# Patient Record
Sex: FEMALE | Race: WHITE | NOT HISPANIC OR LATINO | Employment: OTHER | URBAN - METROPOLITAN AREA
[De-identification: names, ages, dates, MRNs, and addresses within clinical notes are randomized per-mention and may not be internally consistent; named-entity substitution may affect disease eponyms.]

---

## 2017-02-27 NOTE — PATIENT DISCUSSION
POAG, OU: INTRAOCULAR PRESSURE IS WITHIN ACCEPTABLE LIMITS. PT INSTRUCTED TO CONTINUE LATANOPROST QHS OU AND COSOPT BID OU AND RETURN FOR FOLLOW-UP AS SCHEDULED.

## 2017-02-27 NOTE — PATIENT DISCUSSION
CATARACTS, OU - VISUALLY SIGNIFICANT- OD&gt;OS. REFER BACK TO DR. CIRO ALEXIS FOR PRE-OP TESTING, OD FIRST.

## 2017-02-27 NOTE — PATIENT DISCUSSION
Continue: dorzolamide-timolol (dorzolamide-timolol): drops: 2-0.5% 1 drop twice a day into both eyes 08-

## 2017-03-20 NOTE — PATIENT DISCUSSION
CATARACT OD: RBA'S DISCUSSED, PATIENT UNDERSTANDS AND DESIRES TO PROCEED WITH SURGERY. CONSENT READ AND SIGNED.   PATIENT DESIRES STANDARD SET FOR NEAR VISION *(-2.25)

## 2017-03-20 NOTE — PATIENT DISCUSSION
CATARACTS, OU: VISUALLY SIGNIFICANT. OPTION OF SURGERY VERSUS FOLLOWING VERSUS UPDATING GLASSES DISCUSSED. RBA'S DISCUSSED, PATIENT UNDERSTANDS AND DESIRES SURGERY TO INCREASE VISION FOR DRIVING AND GLARE AT NIGHT. SCHEDULE CATARACT SURGERY/PRE-OP OU TODAY.

## 2017-10-23 ENCOUNTER — NEW PATIENT (OUTPATIENT)
Dept: URBAN - METROPOLITAN AREA CLINIC 26 | Facility: CLINIC | Age: 72
End: 2017-10-23

## 2017-10-23 VITALS — WEIGHT: 135 LBS | HEIGHT: 66 IN | BODY MASS INDEX: 21.69 KG/M2

## 2017-10-23 DIAGNOSIS — H43.813: ICD-10-CM

## 2017-10-23 DIAGNOSIS — H35.373: ICD-10-CM

## 2017-10-23 DIAGNOSIS — E07.9: ICD-10-CM

## 2017-10-23 DIAGNOSIS — H47.011: ICD-10-CM

## 2017-10-23 DIAGNOSIS — H35.61: ICD-10-CM

## 2017-10-23 DIAGNOSIS — H43.392: ICD-10-CM

## 2017-10-23 DIAGNOSIS — H35.372: ICD-10-CM

## 2017-10-23 PROCEDURE — 1036F TOBACCO NON-USER: CPT

## 2017-10-23 PROCEDURE — 92235 FLUORESCEIN ANGRPH MLTIFRAME: CPT

## 2017-10-23 PROCEDURE — G8427 DOCREV CUR MEDS BY ELIG CLIN: HCPCS

## 2017-10-23 PROCEDURE — 92083 EXTENDED VISUAL FIELD XM: CPT

## 2017-10-23 PROCEDURE — G8420 CALC BMI NORM PARAMETERS: HCPCS

## 2017-10-23 PROCEDURE — 92225 OPHTHALMOSCOPY (INITIAL): CPT

## 2017-10-23 PROCEDURE — 92250 FUNDUS PHOTOGRAPHY W/I&R: CPT

## 2017-10-23 PROCEDURE — 99204 OFFICE O/P NEW MOD 45 MIN: CPT

## 2017-10-23 ASSESSMENT — TONOMETRY
OS_IOP_MMHG: 10
OD_IOP_MMHG: 16

## 2017-10-23 ASSESSMENT — VISUAL ACUITY
OD_CC: 20/20-1
OS_CC: 20/20

## 2018-04-12 NOTE — PATIENT DISCUSSION
CORNEAL SCAR, OS:  PRESCRIBED ARTIFICIAL TEARS AND UV PROTECTION. RETURN FOR FOLLOW-UP AS SCHEDULED.

## 2018-04-12 NOTE — PATIENT DISCUSSION
POAG, OU: INTRAOCULAR PRESSURE AND RNFL OCT IS WITHIN ACCEPTABLE LIMITS. PT INSTRUCTED TO CONTINUE LATANOPROST QHS OU AND COSOPT BID OU AND RETURN FOR FOLLOW-UP AS SCHEDULED.

## 2018-06-18 ENCOUNTER — NEW PATIENT COMPREHENSIVE (OUTPATIENT)
Dept: URBAN - METROPOLITAN AREA CLINIC 43 | Facility: CLINIC | Age: 73
End: 2018-06-18

## 2018-06-18 VITALS
HEIGHT: 55 IN | HEART RATE: 61 BPM | SYSTOLIC BLOOD PRESSURE: 149 MMHG | RESPIRATION RATE: 14 BRPM | DIASTOLIC BLOOD PRESSURE: 82 MMHG

## 2018-06-18 DIAGNOSIS — Z96.1: ICD-10-CM

## 2018-06-18 DIAGNOSIS — H04.123: ICD-10-CM

## 2018-06-18 DIAGNOSIS — H18.51: ICD-10-CM

## 2018-06-18 DIAGNOSIS — H43.813: ICD-10-CM

## 2018-06-18 DIAGNOSIS — H35.373: ICD-10-CM

## 2018-06-18 DIAGNOSIS — E05.00: ICD-10-CM

## 2018-06-18 PROCEDURE — 1036F TOBACCO NON-USER: CPT

## 2018-06-18 PROCEDURE — G8427 DOCREV CUR MEDS BY ELIG CLIN: HCPCS

## 2018-06-18 PROCEDURE — A4263 PERMANENT TEAR DUCT PLUG: HCPCS

## 2018-06-18 PROCEDURE — G8754 DIAS BP LESS 90: HCPCS

## 2018-06-18 PROCEDURE — 4040F PNEUMOC VAC/ADMIN/RCVD: CPT

## 2018-06-18 PROCEDURE — G8753 SYS BP > OR = 140: HCPCS

## 2018-06-18 PROCEDURE — 92286 ANT SGM IMG I&R SPECLR MIC: CPT

## 2018-06-18 PROCEDURE — 68761S PUNCTUM PLUG / SILICONE,EACH

## 2018-06-18 PROCEDURE — 99204 OFFICE O/P NEW MOD 45 MIN: CPT

## 2018-06-18 PROCEDURE — 92134 CPTRZ OPH DX IMG PST SGM RTA: CPT

## 2018-06-18 PROCEDURE — 92015 DETERMINE REFRACTIVE STATE: CPT

## 2018-06-18 ASSESSMENT — VISUAL ACUITY
OD_GLARE: 20/400
OS_SC: J2
OS_GLARE: 20/400
OS_SC: 20/50-1
OD_CC: J1
OS_CC: 20/25-2
OD_CC: 20/25+2
OD_SC: J2
OS_CC: J1
OD_SC: 20/80-1

## 2018-06-18 ASSESSMENT — TONOMETRY
OD_IOP_MMHG: 20
OS_IOP_MMHG: 20

## 2018-06-29 ENCOUNTER — TECH ONLY (OUTPATIENT)
Dept: URBAN - METROPOLITAN AREA CLINIC 46 | Facility: CLINIC | Age: 73
End: 2018-06-29

## 2018-06-29 DIAGNOSIS — E05.00: ICD-10-CM

## 2018-06-29 DIAGNOSIS — Z96.1: ICD-10-CM

## 2018-06-29 PROCEDURE — 99211T TECH SERVICE

## 2018-06-29 PROCEDURE — 92083 EXTENDED VISUAL FIELD XM: CPT

## 2018-10-02 ENCOUNTER — RETINA CONSULT (OUTPATIENT)
Dept: URBAN - METROPOLITAN AREA CLINIC 46 | Facility: CLINIC | Age: 73
End: 2018-10-02

## 2018-10-02 DIAGNOSIS — H35.371: ICD-10-CM

## 2018-10-02 DIAGNOSIS — H35.30: ICD-10-CM

## 2018-10-02 DIAGNOSIS — H43.813: ICD-10-CM

## 2018-10-02 DIAGNOSIS — H35.372: ICD-10-CM

## 2018-10-02 PROCEDURE — G8756 NO BP MEASURE DOC: HCPCS

## 2018-10-02 PROCEDURE — G9903 PT SCRN TBCO ID AS NON USER: HCPCS

## 2018-10-02 PROCEDURE — 2019F DILATED MACUL EXAM DONE: CPT

## 2018-10-02 PROCEDURE — G8427 DOCREV CUR MEDS BY ELIG CLIN: HCPCS

## 2018-10-02 PROCEDURE — 9222550 BILAT EXTENDED OPHTHALMOSCOPY, FIRST

## 2018-10-02 PROCEDURE — 1036F TOBACCO NON-USER: CPT

## 2018-10-02 PROCEDURE — 92250 FUNDUS PHOTOGRAPHY W/I&R: CPT

## 2018-10-02 PROCEDURE — 92134 CPTRZ OPH DX IMG PST SGM RTA: CPT

## 2018-10-02 PROCEDURE — 4177F TALK PT/CRGVR RE AREDS PREV: CPT

## 2018-10-02 PROCEDURE — 92014 COMPRE OPH EXAM EST PT 1/>: CPT

## 2018-10-02 PROCEDURE — 92235 FLUORESCEIN ANGRPH MLTIFRAME: CPT

## 2018-10-02 ASSESSMENT — TONOMETRY
OD_IOP_MMHG: 20
OS_IOP_MMHG: 14

## 2018-10-02 ASSESSMENT — VISUAL ACUITY
OD_CC: 20/20-1
OS_CC: 20/20-1

## 2018-10-12 NOTE — PATIENT DISCUSSION
POAG, OU: INTRAOCULAR PRESSURE IS WITHIN ACCEPTABLE LIMITS. PT INSTRUCTED TO CONTINUE COSOPT BID OU AND LATANOPROST QHS OU AND RETURN FOR FOLLOW-UP AS SCHEDULED.

## 2019-04-02 ENCOUNTER — ESTABLISHED COMPREHENSIVE EXAM (OUTPATIENT)
Dept: URBAN - METROPOLITAN AREA CLINIC 46 | Facility: CLINIC | Age: 74
End: 2019-04-02

## 2019-04-02 DIAGNOSIS — H35.372: ICD-10-CM

## 2019-04-02 DIAGNOSIS — E05.00: ICD-10-CM

## 2019-04-02 DIAGNOSIS — H35.30: ICD-10-CM

## 2019-04-02 DIAGNOSIS — H43.813: ICD-10-CM

## 2019-04-02 DIAGNOSIS — H35.371: ICD-10-CM

## 2019-04-02 PROCEDURE — 92134 CPTRZ OPH DX IMG PST SGM RTA: CPT

## 2019-04-02 PROCEDURE — 92014 COMPRE OPH EXAM EST PT 1/>: CPT

## 2019-04-02 PROCEDURE — 9222650 BILAT EXTENDED OPHTHALMOSCOPY, F/U

## 2019-04-02 ASSESSMENT — TONOMETRY
OD_IOP_MMHG: 20
OS_IOP_MMHG: 15

## 2019-04-02 ASSESSMENT — VISUAL ACUITY
OS_CC: 20/20-2
OD_CC: 20/20

## 2019-04-17 NOTE — PATIENT DISCUSSION
POSTERIOR CAPSULAR FIBROSIS, OD:  VISUALLY SIGNIFICANT. OPTION OF YAG LASER VERSUS UPDATING GLASSES VERSUS FOLLOWING DISCUSSED. RBA'S DISCUSSED, PATIENT UNDERSTANDS AND DESIRES YAG LASER TO INCREASE VISION FOR WATCHING TV AND DRIVING AT NIGHT. SCHEDULE YAG LASER OD AFTER CAT SX OS. DO PRE-OP YAG PAPERWORK AT 2 WEEK POST-OP CAT VISIT.

## 2019-04-17 NOTE — PATIENT DISCUSSION
CATARACTS, OS: VISUALLY SIGNIFICANT. OPTION OF SURGERY VERSUS FOLLOWING VERSUS UPDATING GLASSES DISCUSSED. RBA'S DISCUSSED, PATIENT UNDERSTANDS AND DESIRES SURGERY TO INCREASE VISION FOR DRIVING AT NIGHT AND WATCHING TV.  SCHEDULE CATARACT SURGERY/PRE-OP OS. (DO PRE-OP YAG PAPERWORK AT 2 WEEK POST-OP CAT VISIT).

## 2019-04-17 NOTE — PATIENT DISCUSSION
AMD (DRY), OD:  PRESCRIBE AREDS 2 VITAMINS AND UV PROTECTION TO SLOW PROGRESSION. SMOKING CESSATION EMPHASIZED. PRESCRIBE REGULAR AMSLER GRID CHECKS TO SELF MONITOR. PATIENT TO CALL WITH CHANGES FOR RETINA CONSULT. RETURN FOR FOLLOW-UP AS SCHEDULED.

## 2019-05-13 ENCOUNTER — TECH ONLY (OUTPATIENT)
Dept: URBAN - METROPOLITAN AREA CLINIC 46 | Facility: CLINIC | Age: 74
End: 2019-05-13

## 2019-05-13 DIAGNOSIS — H35.372: ICD-10-CM

## 2019-05-13 DIAGNOSIS — E05.00: ICD-10-CM

## 2019-05-13 DIAGNOSIS — H35.371: ICD-10-CM

## 2019-05-13 PROCEDURE — 92083 EXTENDED VISUAL FIELD XM: CPT

## 2019-05-13 PROCEDURE — 99211T TECH SERVICE

## 2019-08-09 NOTE — PATIENT DISCUSSION
POAG, OU: INTRAOCULAR PRESSURE IS WITHIN ACCEPTABLE LIMITS. PHOTOS TO DOCUMENT ONH. HVFS STABLE. PT INSTRUCTED TO CONTINUE COSOPT BID OU AND LATANOPROST QHS OU AND RETURN FOR FOLLOW-UP AS SCHEDULED. SWITCH TO 10-2 AT NEXT HVF OD.

## 2019-10-15 ENCOUNTER — FOLLOW UP (OUTPATIENT)
Dept: URBAN - METROPOLITAN AREA CLINIC 46 | Facility: CLINIC | Age: 74
End: 2019-10-15

## 2019-10-15 DIAGNOSIS — H43.813: ICD-10-CM

## 2019-10-15 DIAGNOSIS — E05.00: ICD-10-CM

## 2019-10-15 DIAGNOSIS — H35.30: ICD-10-CM

## 2019-10-15 DIAGNOSIS — H35.372: ICD-10-CM

## 2019-10-15 DIAGNOSIS — H35.371: ICD-10-CM

## 2019-10-15 PROCEDURE — 92250 FUNDUS PHOTOGRAPHY W/I&R: CPT

## 2019-10-15 PROCEDURE — 9222650 BILAT EXTENDED OPHTHALMOSCOPY, F/U

## 2019-10-15 PROCEDURE — 92014 COMPRE OPH EXAM EST PT 1/>: CPT

## 2019-10-15 PROCEDURE — 92134 CPTRZ OPH DX IMG PST SGM RTA: CPT

## 2019-10-15 PROCEDURE — 92235 FLUORESCEIN ANGRPH MLTIFRAME: CPT

## 2019-10-15 ASSESSMENT — VISUAL ACUITY
OD_CC: 20/20-1
OS_CC: 20/20

## 2019-10-15 ASSESSMENT — TONOMETRY
OS_IOP_MMHG: 16
OD_IOP_MMHG: 11

## 2019-10-25 NOTE — PATIENT DISCUSSION
VISUAL DISTURBANCE OU: PATIENT IS UNAWARE WHICH EYE HE WAS SEEING THE LIGHTS/DISTURBANCE. I ADVISED HIM THAT THE CATARACT IN HIS LEFT EYE CAN CAUSE GLARE, HALOS AND STARBURSTS AROUND LIGHTS AND THAT HIS CATARACT OS IS ADVANCED ENOUGH FOR THOSE TO BE PREVALENT. WHAT HE DESCRIBED WAS MORE ALONG THE LINES OF A VISUAL AURA, STATES THAT HE HAS NOT HAD A STROKE AND DENIES TINGLING, NUMBNESS OR HEADACHES. WILL SEND NOTES TO HIS PCP TO INVESTIGATE FURTHER, STATES THAT HE HAD AN APPT ON FRIDAY, BUT DID NOT MENTION VISUAL SYMPTOMS. WILL SCHEDULE PRE-OP FOR CAT SX OS. RECOMMENDED PT PAY CLOSE ATTENTION TO WHICH EYE SYMPTOMS OCCUR IN IF HE EXPERIENCES ANOTHER EPISODE.

## 2019-10-25 NOTE — PATIENT DISCUSSION
POAG, OU: INTRAOCULAR PRESSURE IS WITHIN ACCEPTABLE LIMITS. PT INSTRUCTED TO CONTINUE COSOPT AND LATANOPROST AND RETURN FOR FOLLOW-UP AS SCHEDULED.

## 2019-11-04 NOTE — PATIENT DISCUSSION
KDB PROCEDURE FOR TREATMENT OF GLAUCOMA: THE RISKS AND BENEFITS OF THE PROCEDURE WERE DISCUSSED WITH THE PATIENT. THE PATIENT UNDERSTANDS AND DESIRES TO PROCEED WITH THE KDB PROCEDURE AT THE TIME OF CATARACT SURGERY TO TRY AND REDUCE THE NEED FOR GLAUCOMA MEDICATION. CONSENT FORMS READ AND SIGNED. SCHEDULE KDB OD AT THE TIME OF CATARACT SURGERY.

## 2019-11-04 NOTE — PATIENT DISCUSSION
CATARACTS, OS: VISUALLY SIGNIFICANT. OPTION OF SURGERY VERSUS FOLLOWING VERSUS UPDATING GLASSES DISCUSSED. RBA'S DISCUSSED, PATIENT UNDERSTANDS AND DESIRES SURGERY TO INCREASE VISION FOR WATCHING TV, DRIVING AND READING. SCHEDULE CATARACT SURGERY/PRE-OP OS TODAY.

## 2019-11-04 NOTE — PATIENT DISCUSSION
New Prescription: prednisol ace-gatiflox-bromfen (prednisol ace-gatiflox-bromfen): drops,suspension: 1-0.5-0.075% 1 drop four times a day into affected eye 11-

## 2019-11-04 NOTE — PATIENT DISCUSSION
CATARACT OS: RBA'S DISCUSSED, PATIENT UNDERSTANDS AND DESIRES TO PROCEED WITH SURGERY. CONSENT READ AND SIGNED. PATIENT DESIRES STANDARD SET FOR DISTANCE VISION WITH KDB.

## 2019-11-13 NOTE — PATIENT DISCUSSION
Continue: prednisol ace-gatiflox-bromfen (prednisol ace-gatiflox-bromfen): drops,suspension: 1-0.5-0.075% 1 drop four times a day into affected eye 11-

## 2019-11-22 NOTE — PATIENT DISCUSSION
S/P PC IOL, OS. DOING WELL. CONTINUE DROPS AS DIRECTED. SPECS RX OFFERED. RX ARC IN SPECS TO MINIMIZE GLARE. RETURN FOR FOLLOW-UP AS SCHEDULED- 1 month IOP check s/p KDB.

## 2019-12-31 NOTE — PATIENT DISCUSSION
Stopped Today: prednisol ace-gatiflox-bromfen (prednisol ace-gatiflox-bromfen): drops,suspension: 1-0.5-0.075% 1 drop four times a day into affected eye 11-

## 2019-12-31 NOTE — PATIENT DISCUSSION
IOP DOING WELL S/P KDB OS. CONTINUE COSOPT AND LATANOPROST OD ONLY. RTC 4/20 FOR EC with DR. ALEXIS.

## 2020-01-17 ENCOUNTER — EST. PATIENT EMERGENCY (OUTPATIENT)
Dept: URBAN - METROPOLITAN AREA CLINIC 43 | Facility: CLINIC | Age: 75
End: 2020-01-17

## 2020-01-17 DIAGNOSIS — E05.00: ICD-10-CM

## 2020-01-17 DIAGNOSIS — H04.123: ICD-10-CM

## 2020-01-17 DIAGNOSIS — H04.121: ICD-10-CM

## 2020-01-17 PROCEDURE — 92012 INTRM OPH EXAM EST PATIENT: CPT

## 2020-01-17 PROCEDURE — 68761S PUNCTUM PLUG / SILICONE,EACH

## 2020-01-17 PROCEDURE — A4263 PERMANENT TEAR DUCT PLUG: HCPCS

## 2020-01-17 ASSESSMENT — VISUAL ACUITY
OS_CC: 20/20
OD_CC: 20/20

## 2020-01-17 ASSESSMENT — TONOMETRY: OD_IOP_MMHG: 11

## 2020-05-12 ENCOUNTER — ESTABLISHED PATIENT (OUTPATIENT)
Dept: URBAN - METROPOLITAN AREA CLINIC 46 | Facility: CLINIC | Age: 75
End: 2020-05-12

## 2020-05-12 DIAGNOSIS — H04.123: ICD-10-CM

## 2020-05-12 DIAGNOSIS — H43.813: ICD-10-CM

## 2020-05-12 DIAGNOSIS — H04.121: ICD-10-CM

## 2020-05-12 DIAGNOSIS — H35.371: ICD-10-CM

## 2020-05-12 DIAGNOSIS — H35.372: ICD-10-CM

## 2020-05-12 DIAGNOSIS — H35.30: ICD-10-CM

## 2020-05-12 PROCEDURE — 92014 COMPRE OPH EXAM EST PT 1/>: CPT

## 2020-05-12 PROCEDURE — 92134 CPTRZ OPH DX IMG PST SGM RTA: CPT

## 2020-05-12 PROCEDURE — 92250 FUNDUS PHOTOGRAPHY W/I&R: CPT

## 2020-05-12 PROCEDURE — 92235 FLUORESCEIN ANGRPH MLTIFRAME: CPT

## 2020-05-12 ASSESSMENT — TONOMETRY
OD_IOP_MMHG: 20
OS_IOP_MMHG: 17

## 2020-05-12 ASSESSMENT — VISUAL ACUITY
OD_CC: 20/20-2
OS_CC: 20/20-2

## 2020-06-05 ENCOUNTER — TECH ONLY (OUTPATIENT)
Dept: URBAN - METROPOLITAN AREA CLINIC 46 | Facility: CLINIC | Age: 75
End: 2020-06-05

## 2020-06-05 DIAGNOSIS — H35.371: ICD-10-CM

## 2020-06-05 DIAGNOSIS — H35.372: ICD-10-CM

## 2020-06-05 PROCEDURE — 99211T TECH SERVICE

## 2020-06-05 PROCEDURE — 92083 EXTENDED VISUAL FIELD XM: CPT

## 2020-09-14 NOTE — PATIENT DISCUSSION
POAG, OU: INTRAOCULAR PRESSURE IS WITHIN ACCEPTABLE LIMITS. PT INSTRUCTED TO CONTINUE LATANOPROST HS OD AND DORZOLOMIDE-TIMOLOL BID OD AND RETURN FOR FOLLOW-UP AS SCHEDULED.

## 2020-10-06 ENCOUNTER — ESTABLISHED COMPREHENSIVE EXAM (OUTPATIENT)
Dept: URBAN - METROPOLITAN AREA CLINIC 46 | Facility: CLINIC | Age: 75
End: 2020-10-06

## 2020-10-06 DIAGNOSIS — H35.371: ICD-10-CM

## 2020-10-06 DIAGNOSIS — H43.813: ICD-10-CM

## 2020-10-06 DIAGNOSIS — H35.372: ICD-10-CM

## 2020-10-06 DIAGNOSIS — H35.30: ICD-10-CM

## 2020-10-06 PROCEDURE — 92202 OPSCPY EXTND ON/MAC DRAW: CPT

## 2020-10-06 PROCEDURE — 92014 COMPRE OPH EXAM EST PT 1/>: CPT

## 2020-10-06 PROCEDURE — 92134 CPTRZ OPH DX IMG PST SGM RTA: CPT

## 2020-10-06 ASSESSMENT — VISUAL ACUITY
OD_SC: 20/25
OS_CC: 20/40
OD_CC: 20/20

## 2020-10-06 ASSESSMENT — TONOMETRY
OS_IOP_MMHG: 17
OD_IOP_MMHG: 20

## 2021-02-04 ENCOUNTER — ESTABLISHED PATIENT (OUTPATIENT)
Dept: URBAN - METROPOLITAN AREA CLINIC 46 | Facility: CLINIC | Age: 76
End: 2021-02-04

## 2021-02-04 DIAGNOSIS — E05.00: ICD-10-CM

## 2021-02-04 DIAGNOSIS — H04.123: ICD-10-CM

## 2021-02-04 PROCEDURE — 92012 INTRM OPH EXAM EST PATIENT: CPT

## 2021-02-04 PROCEDURE — 68761S PUNCTUM PLUG / SILICONE,EACH

## 2021-02-04 PROCEDURE — A4263 PERMANENT TEAR DUCT PLUG: HCPCS

## 2021-02-04 ASSESSMENT — VISUAL ACUITY
OS_CC: 20/20
OD_CC: 20/20

## 2021-03-15 NOTE — PATIENT DISCUSSION
POAG, OU: INTRAOCULAR PRESSURE IS WITHIN ACCEPTABLE LIMITS. PT INSTRUCTED TO CONTINUE COSOPT BID OD AND LATANOPROST QHS OD AND RETURN FOR FOLLOW-UP AS SCHEDULED.

## 2021-04-13 ENCOUNTER — ESTABLISHED COMPREHENSIVE EXAM (OUTPATIENT)
Dept: URBAN - METROPOLITAN AREA CLINIC 46 | Facility: CLINIC | Age: 76
End: 2021-04-13

## 2021-04-13 DIAGNOSIS — H35.372: ICD-10-CM

## 2021-04-13 DIAGNOSIS — E05.00: ICD-10-CM

## 2021-04-13 DIAGNOSIS — H35.371: ICD-10-CM

## 2021-04-13 DIAGNOSIS — H43.813: ICD-10-CM

## 2021-04-13 DIAGNOSIS — H35.033: ICD-10-CM

## 2021-04-13 PROCEDURE — 99214 OFFICE O/P EST MOD 30 MIN: CPT

## 2021-04-13 PROCEDURE — 92235 FLUORESCEIN ANGRPH MLTIFRAME: CPT

## 2021-04-13 PROCEDURE — 92250 FUNDUS PHOTOGRAPHY W/I&R: CPT

## 2021-04-13 ASSESSMENT — TONOMETRY
OD_IOP_MMHG: 18
OS_IOP_MMHG: 15

## 2021-04-13 ASSESSMENT — VISUAL ACUITY
OS_CC: 20/20
OD_CC: 20/20

## 2021-04-30 ENCOUNTER — EST. PATIENT EMERGENCY (OUTPATIENT)
Dept: URBAN - METROPOLITAN AREA CLINIC 46 | Facility: CLINIC | Age: 76
End: 2021-04-30

## 2021-04-30 DIAGNOSIS — H04.123: ICD-10-CM

## 2021-04-30 DIAGNOSIS — H02.886: ICD-10-CM

## 2021-04-30 PROCEDURE — 92012 INTRM OPH EXAM EST PATIENT: CPT

## 2021-04-30 RX ORDER — NEOMYCIN SULFATE, POLYMYXIN B SULFATE AND DEXAMETHASONE 3.5; 10000; 1 MG/ML; [USP'U]/ML; MG/ML: 1 SUSPENSION OPHTHALMIC

## 2021-04-30 ASSESSMENT — VISUAL ACUITY
OD_CC: 20/20-2
OS_CC: 20/25+2

## 2021-04-30 ASSESSMENT — TONOMETRY
OS_IOP_MMHG: 18
OD_IOP_MMHG: 18

## 2021-09-13 NOTE — PATIENT DISCUSSION
POAG, OU: INTRAOCULAR PRESSURE IS WITHIN ACCEPTABLE LIMITS. PT INSTRUCTED TO CONTINUE COSOPT BID OD AND LATANOPROST QHS OD. GONIO PREFORMED TODAY TO DOCUMENT ANGLES. RETURN FOR FOLLOW-UP AS SCHEDULED.

## 2021-10-18 ENCOUNTER — ESTABLISHED COMPREHENSIVE EXAM (OUTPATIENT)
Dept: URBAN - METROPOLITAN AREA CLINIC 46 | Facility: CLINIC | Age: 76
End: 2021-10-18

## 2021-10-18 DIAGNOSIS — H35.371: ICD-10-CM

## 2021-10-18 DIAGNOSIS — H40.023: ICD-10-CM

## 2021-10-18 DIAGNOSIS — H43.813: ICD-10-CM

## 2021-10-18 DIAGNOSIS — H04.123: ICD-10-CM

## 2021-10-18 DIAGNOSIS — H35.372: ICD-10-CM

## 2021-10-18 DIAGNOSIS — H35.033: ICD-10-CM

## 2021-10-18 DIAGNOSIS — H02.883: ICD-10-CM

## 2021-10-18 DIAGNOSIS — H02.886: ICD-10-CM

## 2021-10-18 PROCEDURE — 92014 COMPRE OPH EXAM EST PT 1/>: CPT

## 2021-10-18 PROCEDURE — 92133 CPTRZD OPH DX IMG PST SGM ON: CPT

## 2021-10-18 PROCEDURE — 92015 DETERMINE REFRACTIVE STATE: CPT

## 2021-10-18 ASSESSMENT — VISUAL ACUITY
OD_CC: 20/20-1
OS_SC: J5
OD_SC: J5
OD_SC: 20/60+2
OD_CC: J2
OS_SC: 20/50-1
OS_CC: 20/20
OS_CC: J3

## 2021-10-18 ASSESSMENT — TONOMETRY
OD_IOP_MMHG: 13
OS_IOP_MMHG: 13

## 2021-12-21 NOTE — PATIENT DISCUSSION
1 DROP INTO THE RIGHT TWICE A DAY
Comments: OD ONLY
Continue: dorzolamide-timolol (dorzolamide-timolol): drops: 2-0.5% 03-
Continue: latanoprost (latanoprost): drops: 0.005% 03-
General:
INSTILL 1 DROP INTO THE RIGHT EYE AT BEDTIME AS DIRECTED
Medications:
POAG, OU: HVFS STABLE- NEEDS 10-2 OD NEXT TIME. PT INSTRUCTED TO CONTINUE LATANOPROST QHS OD AND DORZ-CHAIM BID OD AND RETURN FOR FOLLOW-UP AS SCHEDULED.
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

## 2022-02-25 NOTE — PATIENT DISCUSSION
POAG, OU: INTRAOCULAR PRESSURE IS WITHIN ACCEPTABLE LIMITS. ADVANCED FIELD DEFECT OD STABLE (SEE 2011), WNL OS. PT INSTRUCTED TO CONTINUE LATANOPROST QHS OU AND COSOPT BID OU AND RETURN FOR FOLLOW-UP AS SCHEDULED. 0

## 2022-03-14 NOTE — PATIENT DISCUSSION
POSTERIOR CAPSULAR FIBROSIS OU: PROGRESSING, UPDATED GLASSES RX GIVEN. Shaving (Optional): The patient shaved at home

## 2022-06-04 ENCOUNTER — TELEPHONE ENCOUNTER (OUTPATIENT)
Dept: URBAN - METROPOLITAN AREA CLINIC 68 | Facility: CLINIC | Age: 77
End: 2022-06-04

## 2022-06-05 ENCOUNTER — TELEPHONE ENCOUNTER (OUTPATIENT)
Dept: URBAN - METROPOLITAN AREA CLINIC 68 | Facility: CLINIC | Age: 77
End: 2022-06-05

## 2022-06-25 ENCOUNTER — TELEPHONE ENCOUNTER (OUTPATIENT)
Age: 77
End: 2022-06-25

## 2022-06-26 ENCOUNTER — TELEPHONE ENCOUNTER (OUTPATIENT)
Age: 77
End: 2022-06-26

## 2022-08-11 NOTE — PATIENT DISCUSSION
"Spoke with the pt's [facility] manager over the phone who reported that the pt told them that they do not feel safe going home, that they do feel suicidal, and that they have been lying to staff in the ED. GH manager stated that they will come to  the pt if we can \"guarantee 100% that the pt is not suicidal.\" Care team updated.    " Vertex Distance:

## 2022-10-05 NOTE — PATIENT DISCUSSION
INTRAOCULAR PRESSURE IS WITHIN ACCEPTABLE LIMITS OU. CONTINUE COSOPT BID OU AND LATANOPROST QHS OU TO  MANAGE INTRAOCULAR PRESSURE. RETURN FOR FOLLOW-UP AS SCHEDULED.

## 2023-05-17 ENCOUNTER — COMPREHENSIVE EXAM (OUTPATIENT)
Dept: URBAN - METROPOLITAN AREA CLINIC 46 | Facility: CLINIC | Age: 78
End: 2023-05-17

## 2023-05-17 DIAGNOSIS — H02.886: ICD-10-CM

## 2023-05-17 DIAGNOSIS — H35.033: ICD-10-CM

## 2023-05-17 DIAGNOSIS — H04.123: ICD-10-CM

## 2023-05-17 DIAGNOSIS — H18.513: ICD-10-CM

## 2023-05-17 DIAGNOSIS — H35.371: ICD-10-CM

## 2023-05-17 DIAGNOSIS — E05.00: ICD-10-CM

## 2023-05-17 DIAGNOSIS — H52.7: ICD-10-CM

## 2023-05-17 DIAGNOSIS — H02.883: ICD-10-CM

## 2023-05-17 DIAGNOSIS — H35.30: ICD-10-CM

## 2023-05-17 DIAGNOSIS — H35.373: ICD-10-CM

## 2023-05-17 DIAGNOSIS — H40.023: ICD-10-CM

## 2023-05-17 PROCEDURE — A4262 TEMPORARY TEAR DUCT PLUG: HCPCS

## 2023-05-17 PROCEDURE — 92015 DETERMINE REFRACTIVE STATE: CPT

## 2023-05-17 PROCEDURE — 68761Q PUNCTAL PLUG/QUINTESS DISSOLVABLE PLUG/EACH

## 2023-05-17 PROCEDURE — 92014 COMPRE OPH EXAM EST PT 1/>: CPT

## 2023-05-17 ASSESSMENT — VISUAL ACUITY
OS_CC: J1
OD_SC: 20/30-1
OD_CC: J1
OS_SC: 20/20-1
OS_SC: J3
OS_CC: 20/20
OD_CC: 20/20
OD_SC: J2

## 2023-05-17 ASSESSMENT — TONOMETRY
OS_IOP_MMHG: 16
OD_IOP_MMHG: 18

## 2023-12-04 ENCOUNTER — FOLLOW UP (OUTPATIENT)
Dept: URBAN - METROPOLITAN AREA CLINIC 46 | Facility: CLINIC | Age: 78
End: 2023-12-04

## 2023-12-04 DIAGNOSIS — H16.223: ICD-10-CM

## 2023-12-04 DIAGNOSIS — H35.373: ICD-10-CM

## 2023-12-04 DIAGNOSIS — H40.023: ICD-10-CM

## 2023-12-04 DIAGNOSIS — E05.00: ICD-10-CM

## 2023-12-04 DIAGNOSIS — H02.886: ICD-10-CM

## 2023-12-04 DIAGNOSIS — H18.513: ICD-10-CM

## 2023-12-04 DIAGNOSIS — H02.883: ICD-10-CM

## 2023-12-04 PROCEDURE — 92012 INTRM OPH EXAM EST PATIENT: CPT

## 2023-12-04 RX ORDER — CYCLOSPORINE 0 MG/ML
1 SOLUTION/ DROPS OPHTHALMIC; TOPICAL TWICE A DAY
Start: 2023-12-04

## 2023-12-04 ASSESSMENT — VISUAL ACUITY
OS_CC: 20/20-1
OD_SC: 20/30
OS_SC: 20/30+2
OD_CC: 20/20

## 2023-12-04 ASSESSMENT — TONOMETRY
OD_IOP_MMHG: 16
OS_IOP_MMHG: 16

## 2024-01-19 ENCOUNTER — EMERGENCY VISIT (OUTPATIENT)
Dept: URBAN - METROPOLITAN AREA CLINIC 43 | Facility: CLINIC | Age: 79
End: 2024-01-19

## 2024-01-19 DIAGNOSIS — H35.373: ICD-10-CM

## 2024-01-19 DIAGNOSIS — G43.B0: ICD-10-CM

## 2024-01-19 PROCEDURE — 92012 INTRM OPH EXAM EST PATIENT: CPT

## 2024-01-19 PROCEDURE — 92134 CPTRZ OPH DX IMG PST SGM RTA: CPT

## 2024-01-19 ASSESSMENT — TONOMETRY
OD_IOP_MMHG: 18
OS_IOP_MMHG: 19

## 2024-01-19 ASSESSMENT — VISUAL ACUITY
OD_CC: 20/20-1
OS_CC: 20/20

## 2024-06-05 ENCOUNTER — COMPREHENSIVE EXAM (OUTPATIENT)
Dept: URBAN - METROPOLITAN AREA CLINIC 46 | Facility: CLINIC | Age: 79
End: 2024-06-05

## 2024-06-05 DIAGNOSIS — H18.513: ICD-10-CM

## 2024-06-05 DIAGNOSIS — H35.373: ICD-10-CM

## 2024-06-05 DIAGNOSIS — H52.7: ICD-10-CM

## 2024-06-05 DIAGNOSIS — H02.883: ICD-10-CM

## 2024-06-05 DIAGNOSIS — H02.886: ICD-10-CM

## 2024-06-05 DIAGNOSIS — H16.223: ICD-10-CM

## 2024-06-05 DIAGNOSIS — H40.023: ICD-10-CM

## 2024-06-05 PROCEDURE — 92133 CPTRZD OPH DX IMG PST SGM ON: CPT

## 2024-06-05 PROCEDURE — 92015 DETERMINE REFRACTIVE STATE: CPT

## 2024-06-05 PROCEDURE — 92014 COMPRE OPH EXAM EST PT 1/>: CPT

## 2024-06-05 ASSESSMENT — VISUAL ACUITY
OU_CC: J1
OD_CC: J1
OD_CC: 20/20
OS_SC: 20/25+2
OD_SC: 20/30
OS_CC: J1
OS_CC: 20/20-1

## 2024-06-05 ASSESSMENT — TONOMETRY
OS_IOP_MMHG: 16
OD_IOP_MMHG: 16

## 2024-12-17 ENCOUNTER — PREPPED CHART (OUTPATIENT)
Age: 79
End: 2024-12-17

## 2025-02-17 ENCOUNTER — FOLLOW UP (OUTPATIENT)
Age: 80
End: 2025-02-17

## 2025-02-17 DIAGNOSIS — H16.223: ICD-10-CM

## 2025-02-17 DIAGNOSIS — H35.373: ICD-10-CM

## 2025-02-17 DIAGNOSIS — H40.023: ICD-10-CM

## 2025-02-17 PROCEDURE — 92083 EXTENDED VISUAL FIELD XM: CPT

## 2025-02-17 PROCEDURE — 92134 CPTRZ OPH DX IMG PST SGM RTA: CPT

## 2025-02-17 PROCEDURE — 92012 INTRM OPH EXAM EST PATIENT: CPT

## 2025-08-25 ENCOUNTER — COMPREHENSIVE EXAM (OUTPATIENT)
Age: 80
End: 2025-08-25

## 2025-08-25 DIAGNOSIS — H40.023: ICD-10-CM

## 2025-08-25 DIAGNOSIS — H52.7: ICD-10-CM

## 2025-08-25 DIAGNOSIS — H18.513: ICD-10-CM

## 2025-08-25 DIAGNOSIS — H35.373: ICD-10-CM

## 2025-08-25 DIAGNOSIS — H02.886: ICD-10-CM

## 2025-08-25 DIAGNOSIS — G43.B0: ICD-10-CM

## 2025-08-25 DIAGNOSIS — H16.223: ICD-10-CM

## 2025-08-25 DIAGNOSIS — H02.883: ICD-10-CM

## 2025-08-25 PROCEDURE — 92015 DETERMINE REFRACTIVE STATE: CPT

## 2025-08-25 PROCEDURE — 92014 COMPRE OPH EXAM EST PT 1/>: CPT

## 2025-08-25 PROCEDURE — 92133 CPTRZD OPH DX IMG PST SGM ON: CPT
